# Patient Record
Sex: MALE | Employment: FULL TIME | URBAN - METROPOLITAN AREA
[De-identification: names, ages, dates, MRNs, and addresses within clinical notes are randomized per-mention and may not be internally consistent; named-entity substitution may affect disease eponyms.]

---

## 2017-02-15 ENCOUNTER — TELEPHONE (OUTPATIENT)
Dept: RADIATION ONCOLOGY | Facility: HOSPITAL | Age: 72
End: 2017-02-15

## 2017-02-15 NOTE — TELEPHONE ENCOUNTER
Left 2nd message to see if an appointment this Friday 9am would work with his schedule. Asked to call me back.

## 2017-02-16 ENCOUNTER — TELEPHONE (OUTPATIENT)
Dept: RADIATION ONCOLOGY | Facility: HOSPITAL | Age: 72
End: 2017-02-16

## 2017-02-16 NOTE — TELEPHONE ENCOUNTER
Pt identifies himself on VM. Left another message regarding appt tomorrow with Dr Sameer Leonard. Asked him to call back to confirm. Explained that Dr Hamlet Martínez asked us to call him to arrange the consultation.

## 2017-02-17 ENCOUNTER — APPOINTMENT (OUTPATIENT)
Dept: RADIATION ONCOLOGY | Facility: HOSPITAL | Age: 72
End: 2017-02-17
Attending: RADIOLOGY
Payer: MEDICARE

## 2017-02-21 ENCOUNTER — TELEPHONE (OUTPATIENT)
Dept: RADIATION ONCOLOGY | Facility: HOSPITAL | Age: 72
End: 2017-02-21

## 2017-02-21 NOTE — TELEPHONE ENCOUNTER
TC placed to patient to confirm appointment for radiation consult with our office 2/23 @ 9:00 am. Patient identified self on voice mail,LVM with the reminder & asked he return our call to confirm or re schedule.

## 2017-02-21 NOTE — TELEPHONE ENCOUNTER
TC received from patient, returning our call regarding upcoming appointment. Patient states he will be cancelling his appointment for radiation consult at this time, will call us back if he chooses to re schedule.

## 2017-02-23 ENCOUNTER — HOSPITAL ENCOUNTER (OUTPATIENT)
Dept: RADIATION ONCOLOGY | Facility: HOSPITAL | Age: 72
End: 2017-02-23
Attending: RADIOLOGY
Payer: MEDICARE

## 2025-05-30 ENCOUNTER — HOSPITAL ENCOUNTER (OUTPATIENT)
Dept: GENERAL RADIOLOGY | Age: 80
Discharge: HOME | End: 2025-05-30
Attending: INTERNAL MEDICINE
Payer: MEDICARE

## 2025-05-30 DIAGNOSIS — M54.40 LUMBAGO WITH SCIATICA: ICD-10-CM

## 2025-05-30 DIAGNOSIS — M40.295 OTHER KYPHOSIS, THORACOLUMBAR REGION: ICD-10-CM

## 2025-05-30 PROCEDURE — 72110 X-RAY EXAM L-2 SPINE 4/>VWS: CPT | Performed by: INTERNAL MEDICINE
